# Patient Record
Sex: FEMALE | Race: WHITE | NOT HISPANIC OR LATINO | Employment: PART TIME | URBAN - METROPOLITAN AREA
[De-identification: names, ages, dates, MRNs, and addresses within clinical notes are randomized per-mention and may not be internally consistent; named-entity substitution may affect disease eponyms.]

---

## 2021-06-30 ENCOUNTER — OFFICE VISIT (OUTPATIENT)
Dept: FAMILY MEDICINE | Age: 46
End: 2021-06-30

## 2021-06-30 VITALS
WEIGHT: 160.4 LBS | HEART RATE: 88 BPM | HEIGHT: 65 IN | OXYGEN SATURATION: 96 % | SYSTOLIC BLOOD PRESSURE: 130 MMHG | BODY MASS INDEX: 26.73 KG/M2 | DIASTOLIC BLOOD PRESSURE: 80 MMHG

## 2021-06-30 DIAGNOSIS — R07.9 CHEST PAIN, UNSPECIFIED TYPE: Primary | ICD-10-CM

## 2021-06-30 PROCEDURE — 99203 OFFICE O/P NEW LOW 30 MIN: CPT | Performed by: PHYSICIAN ASSISTANT

## 2021-06-30 PROCEDURE — 93000 ELECTROCARDIOGRAM COMPLETE: CPT | Performed by: PHYSICIAN ASSISTANT

## 2021-06-30 ASSESSMENT — PATIENT HEALTH QUESTIONNAIRE - PHQ9
SUM OF ALL RESPONSES TO PHQ9 QUESTIONS 1 AND 2: 0
2. FEELING DOWN, DEPRESSED OR HOPELESS: NOT AT ALL
CLINICAL INTERPRETATION OF PHQ2 SCORE: NO FURTHER SCREENING NEEDED
SUM OF ALL RESPONSES TO PHQ9 QUESTIONS 1 AND 2: 0
CLINICAL INTERPRETATION OF PHQ9 SCORE: NO FURTHER SCREENING NEEDED
1. LITTLE INTEREST OR PLEASURE IN DOING THINGS: NOT AT ALL

## 2021-07-01 ENCOUNTER — TELEPHONE (OUTPATIENT)
Dept: FAMILY MEDICINE | Age: 46
End: 2021-07-01

## 2021-07-01 DIAGNOSIS — Z01.812 PRE-PROCEDURAL LABORATORY EXAMINATION: Primary | ICD-10-CM

## 2021-07-27 ENCOUNTER — LAB SERVICES (OUTPATIENT)
Dept: LAB | Age: 46
End: 2021-07-27

## 2021-07-27 DIAGNOSIS — Z01.812 PRE-PROCEDURAL LABORATORY EXAMINATION: ICD-10-CM

## 2021-07-27 PROCEDURE — U0005 INFEC AGEN DETEC AMPLI PROBE: HCPCS | Performed by: INTERNAL MEDICINE

## 2021-07-27 PROCEDURE — U0003 INFECTIOUS AGENT DETECTION BY NUCLEIC ACID (DNA OR RNA); SEVERE ACUTE RESPIRATORY SYNDROME CORONAVIRUS 2 (SARS-COV-2) (CORONAVIRUS DISEASE [COVID-19]), AMPLIFIED PROBE TECHNIQUE, MAKING USE OF HIGH THROUGHPUT TECHNOLOGIES AS DESCRIBED BY CMS-2020-01-R: HCPCS | Performed by: INTERNAL MEDICINE

## 2021-07-28 LAB
SARS-COV-2 RNA RESP QL NAA+PROBE: NOT DETECTED
SERVICE CMNT-IMP: NORMAL
SERVICE CMNT-IMP: NORMAL

## 2021-07-29 ENCOUNTER — TELEPHONE (OUTPATIENT)
Dept: FAMILY MEDICINE | Age: 46
End: 2021-07-29

## 2021-07-29 ENCOUNTER — HOSPITAL ENCOUNTER (OUTPATIENT)
Dept: CV DIAGNOSTICS | Age: 46
Discharge: HOME | End: 2021-07-29
Attending: PHYSICIAN ASSISTANT

## 2021-07-29 DIAGNOSIS — R07.9 CHEST PAIN, UNSPECIFIED TYPE: ICD-10-CM

## 2021-07-29 PROCEDURE — 93352 ADMIN ECG CONTRAST AGENT: CPT | Performed by: INTERNAL MEDICINE

## 2021-07-29 PROCEDURE — 93351 STRESS TTE COMPLETE: CPT | Performed by: INTERNAL MEDICINE

## 2021-07-29 PROCEDURE — 10004168 STRESS TEST

## 2023-03-09 ENCOUNTER — OFFICE VISIT (OUTPATIENT)
Dept: FAMILY MEDICINE | Facility: CLINIC | Age: 48
End: 2023-03-09
Payer: COMMERCIAL

## 2023-03-09 VITALS
RESPIRATION RATE: 16 BRPM | TEMPERATURE: 97.7 F | HEART RATE: 89 BPM | HEIGHT: 66 IN | BODY MASS INDEX: 25.44 KG/M2 | DIASTOLIC BLOOD PRESSURE: 72 MMHG | WEIGHT: 158.3 LBS | SYSTOLIC BLOOD PRESSURE: 118 MMHG | OXYGEN SATURATION: 100 %

## 2023-03-09 DIAGNOSIS — H91.91 HEARING LOSS OF RIGHT EAR, UNSPECIFIED HEARING LOSS TYPE: Primary | ICD-10-CM

## 2023-03-09 PROCEDURE — 99203 OFFICE O/P NEW LOW 30 MIN: CPT | Performed by: STUDENT IN AN ORGANIZED HEALTH CARE EDUCATION/TRAINING PROGRAM

## 2023-03-09 ASSESSMENT — PAIN SCALES - GENERAL: PAINLEVEL: NO PAIN (0)

## 2023-03-09 NOTE — PROGRESS NOTES
"  1. Hearing loss of right ear, unspecified hearing loss type  > unclear if sensorineural vs conductive hearing loss, no tuning fork in clinic   > patient failed clinic performed hearing screen in right ear   - no history of trauma to her ear, although she has been to multiple concerts as a teenager   - Adult Audiology  Referral; Future      Subjective   Alva is a 47 year old, presenting for the following health issues:  Hearing Problem and Establish Care      Hearing Problem    History of Present Illness       Reason for visit:  Hearing loss, establish care    She eats 2-3 servings of fruits and vegetables daily.She consumes 0 sweetened beverage(s) daily.She exercises with enough effort to increase her heart rate 20 to 29 minutes per day.  She exercises with enough effort to increase her heart rate 3 or less days per week.   She is taking medications regularly.       Concern - Hearing loss  Onset: years   Description: in her right ear. No pain. Constant white noise. No fullness feeling or pressure. Family has been complaining about it. Got ear buds for Rodger and couldn't hear anything in her right ear unless it was at max volume  Intensity: severe  Progression of Symptoms:  same  Accompanying Signs & Symptoms: none  Previous history of similar problem: none  Precipitating factors:        Worsened by: none  Alleviating factors:        Improved by: none  Therapies tried and outcome:  none     Works in a [employer] store   No history of loud noise exposure other than occasional concerts as a teenager 3 times per year   No trauma to ear   Does use q-tips to clear out her cerumen   Endorses constant static sounding noise in the right ear       Review of Systems   As above       Objective    /72 (BP Location: Right arm, Patient Position: Sitting, Cuff Size: Adult Regular)   Pulse 89   Temp 97.7  F (36.5  C) (Tympanic)   Resp 16   Ht 1.674 m (5' 5.91\")   Wt 71.8 kg (158 lb 4.8 oz)   LMP 03/02/2023 " (Approximate)   SpO2 100%   BMI 25.62 kg/m    Body mass index is 25.62 kg/m .  Physical Exam  Constitutional:       General: She is not in acute distress.     Appearance: Normal appearance.   HENT:      Head: Normocephalic.      Right Ear: Ear canal and external ear normal. There is no impacted cerumen.      Left Ear: Tympanic membrane, ear canal and external ear normal. There is no impacted cerumen.      Ears:      Comments: Two bubbles noted in right TM, there was no perforation, infection, erythema, draining, or obvious signs of infection      Nose: Nose normal. No congestion or rhinorrhea.      Mouth/Throat:      Mouth: Mucous membranes are moist.      Pharynx: Oropharynx is clear. No oropharyngeal exudate or posterior oropharyngeal erythema.      Comments: Chewing gum   Eyes:      General: No scleral icterus.        Right eye: No discharge.         Left eye: No discharge.      Extraocular Movements: Extraocular movements intact.      Conjunctiva/sclera: Conjunctivae normal.   Pulmonary:      Effort: Pulmonary effort is normal. No respiratory distress.   Musculoskeletal:         General: Normal range of motion.      Cervical back: Normal range of motion.   Skin:     General: Skin is warm.      Comments: No rash on exposed skin   Neurological:      General: No focal deficit present.      Mental Status: She is alert and oriented to person, place, and time.   Psychiatric:         Mood and Affect: Mood normal.         Behavior: Behavior normal.

## 2023-05-01 ENCOUNTER — TELEPHONE (OUTPATIENT)
Dept: FAMILY MEDICINE | Facility: CLINIC | Age: 48
End: 2023-05-01
Payer: COMMERCIAL

## 2023-05-01 NOTE — TELEPHONE ENCOUNTER
Patient Quality Outreach    Patient is due for the following:   Colon Cancer Screening  Breast Cancer Screening - Mammogram  Cervical Cancer Screening - PAP Needed  Physical Preventive Adult Physical    Next Steps:   Schedule a Adult Preventative    Type of outreach:    Sent letter.    Next Steps:  Reach out within 90 days via Letter.    Max number of attempts reached: Yes. Will try again in 90 days if patient still on fail list.    Questions for provider review:    None     Mary Jane CAMPBELL LPN

## 2023-05-01 NOTE — LETTER
May 1, 2023      Alva Trevino  [address] [place] [id number]    Your team at [facility] cares about your health. We have reviewed your chart and based on our findings; we are making the following recommendations to better manage your health.     You are in particular need of attention regarding the following:     Schedule Annual MAMMOGRAPHY. [facility] scheduling number is [phone] or schedule in [employer] (self referral).  1 in 8 women will develop invasive breast cancer during her lifetime and it is the most common non-skin cancer in [place] Women. EARLY detection, new treatments, and a better understanding of the disease have increased survival rates- the 5 year survival rate in the 1960's was 63% and today it is close to 90%.  If you are under/uninsured, we recommend you contact the [facility]. They offer mammograms at no charge or on a sliding fee charge. You can schedule with them at [phone]. Please have them send us the results.     Schedule a PREVENTATIVE VISIT: Physical with your provider for a Pap Smear to screen for Cervical Cancer.    Call or MyChart message your clinic to schedule a colonoscopy, schedule/ a FIT Test, or order a Cologuard test. If you are unsure what type of test you need, please call your clinic and speak to clinic staff.   Colon cancer is now the second leading cause of cancer-related deaths in the [place] for both men and women and there are over 130,000 new cases and 50,000 deaths per year from colon cancer. Colonoscopies can prevent 90-95% of these deaths. Problem lesions can be removed before they ever become cancer. This test is not only looking for cancer, but also getting rid of precancerous lesions.   If you are under/uninsured, we recommend you contact the [facility] is a free colorectal cancer screening program that provides colonoscopies for eligible under/uninsured [place] men and women. If you are  interested in receiving a free colonoscopy, please call [facility] at t [phone] (mention code [facility]) to see if you're eligible. Please have them send us the results at [phone].     If you have already completed these items, please contact the clinic via phone or   [employer] so your care team can review and update your records. Thank you for   choosing [facility] for your healthcare needs. For any questions,   concerns, or to schedule an appointment please contact our clinic.    Healthy Regards,      Your [facility] Team

## 2023-05-04 ENCOUNTER — OFFICE VISIT (OUTPATIENT)
Dept: AUDIOLOGY | Facility: CLINIC | Age: 48
End: 2023-05-04
Attending: STUDENT IN AN ORGANIZED HEALTH CARE EDUCATION/TRAINING PROGRAM
Payer: COMMERCIAL

## 2023-05-04 DIAGNOSIS — H91.91 HEARING LOSS OF RIGHT EAR, UNSPECIFIED HEARING LOSS TYPE: ICD-10-CM

## 2023-05-04 DIAGNOSIS — H93.11 RIGHT-SIDED TINNITUS: ICD-10-CM

## 2023-05-04 DIAGNOSIS — H90.3 SENSORINEURAL HEARING LOSS, ASYMMETRICAL: Primary | ICD-10-CM

## 2023-05-04 PROCEDURE — 92557 COMPREHENSIVE HEARING TEST: CPT | Performed by: AUDIOLOGIST

## 2023-05-04 PROCEDURE — 92550 TYMPANOMETRY & REFLEX THRESH: CPT | Performed by: AUDIOLOGIST

## 2023-05-04 NOTE — PROGRESS NOTES
AUDIOLOGY REPORT    SUBJECTIVE:  Alva Trevino is a 47 year old female who was seen in the Audiology Clinic [facility] on 5/04/23 for audiologic evaluation, referred by Ana Paula Fenton MD.  The patient reports a hearing loss in her right ear only which has been present for the past 10 years or so. She reports that her mother also has single sided hearing loss. Patient reports tinnitus in her right ear only. The patient denies  tinnitus in the left ear, bilateral otalgia, bilateral drainage, bilateral aural fullness, history of noise exposure, and dizziness. The patient notes difficulty with communication in a variety of listening situations. Patient was unaccompanied to today's visit.     Abuse Screening:  Do you feel unsafe at home or work/school? No  Do you feel threatened by someone? No  Does anyone try to keep you from having contact with others, or doing things outside of your home? No  Physical signs of abuse present? No     Fall Risk Screen:  1. Have you fallen two or more times in the past year? No  2. Have you fallen and had an injury in the past year? No    OBJECTIVE:    Otoscopic exam indicates ears are clear of cerumen bilaterally     Pure Tone Thresholds assessed using standard techniques  audiometry with good  reliability from 250-8000 Hz bilaterally using insert earphones and circumaural headphones     RIGHT:  moderate-severe and profound sensorineural hearing loss    LEFT:    normal hearing sensitivity through 6000 Hz then a mild most likely sensorineural hearing loss at 8000 Hz only   NOTE: Changing to ER-3A inserts significantly improved thresholds at 8000 Hz in the left ear only    Jyotsna: Negative     Tympanogram:    RIGHT: normal eardrum mobility    LEFT:   normal eardrum mobility    Reflexes (reported by stimulus ear): 1000 Hz  RIGHT: Ipsilateral is absent at frequencies tested  RIGHT: Contralateral is elevated   LEFT:   Ipsilateral is present at normal levels  LEFT:    Contralateral is present at normal levels    Speech Reception Threshold:    RIGHT: 95 dB HL    LEFT:   10 dB HL    Word Recognition Score:     RIGHT: 28% at 100 dB HL using NU-6 recorded word list.    LEFT:   100% at 50 dB HL using NU-6 recorded word list.    ASSESSMENT:   Bilateral asymmetric sensorineural hearing loss      Today s results were discussed with the patient in detail.     PLAN:  Patient was counseled regarding hearing loss and impact on communication.  Patient is a good candidate for CROS amplification at this time. Handout on good communication strategies, and hearing aid use was given to patient. It is recommended that the patient see ENT. Following ENT appointment patient is welcome to return to audiology for a consultation on CROS technology if she would like. Please call this clinic with questions regarding these results or recommendations.    Tc Hobbs CCC-A  Licensed Audiologist #[id number]  5/4/2023    CC: Dr. Fenton

## 2023-05-21 ENCOUNTER — HEALTH MAINTENANCE LETTER (OUTPATIENT)
Age: 48
End: 2023-05-21

## 2023-08-25 NOTE — PROGRESS NOTES
Chief Complaint   Patient presents with    Hearing Problem     Gradual hearing loss in right ear go over results from 5/4/23     History of Present Illness   Alva Trevino is a 47 year old female who presents to me today for ear evaluation.  I am seeing this patient in consultation for hearing loss right ear at the request of the provider Dr. Fenton. The patient reports gradual decreased hearing in the right ear for the last few years.  It was thought sudden in onset.  The patient has noticed increased difficulty hearing certain sounds and difficulty in understanding others, especially in noisy or crowded situations.  There is no history of recent head trauma, or chronic ear disease or ear surgery.  The patient denies significant recreational or work-related noise exposure.  No family history of hearing loss at a young age. The patient denies vertigo, otorrhea, otalgia.  Intermittent tinnitus greater on the right-hand side.    The patient underwent an audiogram performed 5/4/2023.  My review of the audiogram showed severe sensorineural hearing loss in the right ear and normal hearing to 4000 Hz sloping to mild high-frequency sensorineural hearing loss in the left ear.  Pure-tone average was 82 dB on the right and 11 dB on the left.  Speech reception threshold was 95 dB on the right and 10 dB on the left.  The patient had 28% word recognition on the right and 100% word recognition on the left.  The patient had a type A tympanogram on the right and a type A tympanogram on the left.    Past Medical History  There is no problem list on file for this patient.    Current Medications     Current Outpatient Medications:     diazepam (VALIUM) 5 MG tablet, Take 1 tablet (5 mg) by mouth once for 1 dose 20-30 minutes prior to MRI. Patient needs  home, Disp: 1 tablet, Rfl: 0    Allergies  No Known Allergies    Social History   Social History     Socioeconomic History    Marital status:    Tobacco Use    Smoking  status: Never    Smokeless tobacco: Never   Vaping Use    Vaping Use: Never used   Substance and Sexual Activity    Alcohol use: Yes    Drug use: Never    Sexual activity: Yes     Partners: Male     Birth control/protection: None   Other Topics Concern    Parent/sibling w/ CABG, MI or angioplasty before 65F 55M? No       Family History  Family History   Problem Relation Age of Onset    Coronary Artery Disease Paternal Grandfather        Review of Systems  As per HPI and PMHx, otherwise 10+ comprehensive system review is negative.    Physical Exam  /76 (BP Location: Right arm, Patient Position: Chair, Cuff Size: Adult Regular)   Pulse 79   Temp 98.5  F (36.9  C) (Tympanic)   Wt 70.3 kg (155 lb)   BMI 25.09 kg/m    GENERAL: Patient is a pleasant, cooperative 47 year old female in no acute distress.  HEAD: Normocephalic, atraumatic.  Hair and scalp are normal.  EYES: Pupils are equal, round, reactive to light and accommodation.  Extraocular movements are intact.  The sclera nonicteric without injection.  The extraocular structures are normal.  EARS: Normal shape and symmetry.  No tenderness when palpating the mastoid or tragal areas bilaterally.  Otoscopic exam reveals a minimal amount of cerumen bilaterally.  The bilateral tympanic membranes are round, intact without evidence of effusion, good landmarks.  No retraction, granulation, or drainage.  NOSE: Nares are patent.  Nasal mucosa is pink and moist.  Negative anterior rhinoscopy.  NEUROLOGIC: Cranial nerves II through XII are grossly intact.  Voice is strong.  Patient is House-Brackmann I/VI bilaterally.  CARDIOVASCULAR: Extremities are warm and well-perfused.  No significant peripheral edema.  RESPIRATORY: Patient has nonlabored breathing without cough, wheeze, stridor.  PSYCHIATRIC: Patient is alert and oriented.  Mood and affect appear normal.  SKIN: Warm and dry.  No scalp, face, or neck lesions noted.    Assessment and Plan     ICD-10-CM    1.  Sensorineural hearing loss, bilateral  H90.3 MR Brain w/o & w Contrast     diazepam (VALIUM) 5 MG tablet      2. High frequency sensorineural hearing loss of left ear  H90.5 MR Brain w/o & w Contrast     diazepam (VALIUM) 5 MG tablet      3. Asymmetrical sensorineural hearing loss  H90.3 MR Brain w/o & w Contrast     diazepam (VALIUM) 5 MG tablet        It was my pleasure seeing Alva Trevino today in clinic.  The patient presents today with significant right sensorineural hearing loss.  Given the severe nature of her asymmetry and the previous sudden nature of the change, I would recommend an MRI with IAC protocol to rule out retrocochlear pathology.  We spent the remainder of today's visit on education. We discussed hearing protection in noisy environments.    The patient is medically cleared for consideration of a hearing aid evaluation.    The patient will follow up as necessary for worsening symptoms or changes in symptoms. I have also recommended repeat audiogram in 1-3 years, or sooner if symptoms warrant.      Flynn De Leon MD  Department of Otolaryngology-Head and Neck Surgery  [facility]

## 2023-08-28 ENCOUNTER — OFFICE VISIT (OUTPATIENT)
Dept: OTOLARYNGOLOGY | Facility: CLINIC | Age: 48
End: 2023-08-28
Payer: COMMERCIAL

## 2023-08-28 VITALS
BODY MASS INDEX: 25.09 KG/M2 | DIASTOLIC BLOOD PRESSURE: 76 MMHG | SYSTOLIC BLOOD PRESSURE: 120 MMHG | HEART RATE: 79 BPM | TEMPERATURE: 98.5 F | WEIGHT: 155 LBS

## 2023-08-28 DIAGNOSIS — H90.3 ASYMMETRICAL SENSORINEURAL HEARING LOSS: ICD-10-CM

## 2023-08-28 DIAGNOSIS — H90.3 SENSORINEURAL HEARING LOSS, BILATERAL: Primary | ICD-10-CM

## 2023-08-28 DIAGNOSIS — H90.5 HIGH FREQUENCY SENSORINEURAL HEARING LOSS OF LEFT EAR: ICD-10-CM

## 2023-08-28 PROCEDURE — 99204 OFFICE O/P NEW MOD 45 MIN: CPT | Performed by: OTOLARYNGOLOGY

## 2023-08-28 RX ORDER — DIAZEPAM 5 MG
5 TABLET ORAL ONCE
Qty: 1 TABLET | Refills: 0 | Status: SHIPPED | OUTPATIENT
Start: 2023-08-28 | End: 2023-08-28

## 2023-08-28 ASSESSMENT — PAIN SCALES - GENERAL: PAINLEVEL: NO PAIN (0)

## 2023-08-28 NOTE — LETTER
8/28/2023         RE: Alva Trevino  [address] [place] [id number]        Dear Colleague,    Thank you for referring your patient, Alva Trevino, to the [facility]. Please see a copy of my visit note below.    Chief Complaint   Patient presents with     Hearing Problem     Gradual hearing loss in right ear go over results from 5/4/23     History of Present Illness   Alva Trevino is a 47 year old female who presents to me today for ear evaluation.  I am seeing this patient in consultation for hearing loss right ear at the request of the provider Dr. Fenton. The patient reports gradual decreased hearing in the right ear for the last few years.  It was thought sudden in onset.  The patient has noticed increased difficulty hearing certain sounds and difficulty in understanding others, especially in noisy or crowded situations.  There is no history of recent head trauma, or chronic ear disease or ear surgery.  The patient denies significant recreational or work-related noise exposure.  No family history of hearing loss at a young age. The patient denies vertigo, otorrhea, otalgia.  Intermittent tinnitus greater on the right-hand side.    The patient underwent an audiogram performed 5/4/2023.  My review of the audiogram showed severe sensorineural hearing loss in the right ear and normal hearing to 4000 Hz sloping to mild high-frequency sensorineural hearing loss in the left ear.  Pure-tone average was 82 dB on the right and 11 dB on the left.  Speech reception threshold was 95 dB on the right and 10 dB on the left.  The patient had 28% word recognition on the right and 100% word recognition on the left.  The patient had a type A tympanogram on the right and a type A tympanogram on the left.    Past Medical History  There is no problem list on file for this patient.    Current Medications     Current Outpatient Medications:      diazepam (VALIUM) 5 MG tablet, Take 1 tablet (5 mg) by mouth once  for 1 dose 20-30 minutes prior to MRI. Patient needs  home, Disp: 1 tablet, Rfl: 0    Allergies  No Known Allergies    Social History   Social History     Socioeconomic History     Marital status:    Tobacco Use     Smoking status: Never     Smokeless tobacco: Never   Vaping Use     Vaping Use: Never used   Substance and Sexual Activity     Alcohol use: Yes     Drug use: Never     Sexual activity: Yes     Partners: Male     Birth control/protection: None   Other Topics Concern     Parent/sibling w/ CABG, MI or angioplasty before 65F 55M? No       Family History  Family History   Problem Relation Age of Onset     Coronary Artery Disease Paternal Grandfather        Review of Systems  As per HPI and PMHx, otherwise 10+ comprehensive system review is negative.    Physical Exam  /76 (BP Location: Right arm, Patient Position: Chair, Cuff Size: Adult Regular)   Pulse 79   Temp 98.5  F (36.9  C) (Tympanic)   Wt 70.3 kg (155 lb)   BMI 25.09 kg/m    GENERAL: Patient is a pleasant, cooperative 47 year old female in no acute distress.  HEAD: Normocephalic, atraumatic.  Hair and scalp are normal.  EYES: Pupils are equal, round, reactive to light and accommodation.  Extraocular movements are intact.  The sclera nonicteric without injection.  The extraocular structures are normal.  EARS: Normal shape and symmetry.  No tenderness when palpating the mastoid or tragal areas bilaterally.  Otoscopic exam reveals a minimal amount of cerumen bilaterally.  The bilateral tympanic membranes are round, intact without evidence of effusion, good landmarks.  No retraction, granulation, or drainage.  NOSE: Nares are patent.  Nasal mucosa is pink and moist.  Negative anterior rhinoscopy.  NEUROLOGIC: Cranial nerves II through XII are grossly intact.  Voice is strong.  Patient is House-Brackmann I/VI bilaterally.  CARDIOVASCULAR: Extremities are warm and well-perfused.  No significant peripheral edema.  RESPIRATORY: Patient  has nonlabored breathing without cough, wheeze, stridor.  PSYCHIATRIC: Patient is alert and oriented.  Mood and affect appear normal.  SKIN: Warm and dry.  No scalp, face, or neck lesions noted.    Assessment and Plan     ICD-10-CM    1. Sensorineural hearing loss, bilateral  H90.3 MR Brain w/o & w Contrast     diazepam (VALIUM) 5 MG tablet      2. High frequency sensorineural hearing loss of left ear  H90.5 MR Brain w/o & w Contrast     diazepam (VALIUM) 5 MG tablet      3. Asymmetrical sensorineural hearing loss  H90.3 MR Brain w/o & w Contrast     diazepam (VALIUM) 5 MG tablet        It was my pleasure seeing Alva Trevino today in clinic.  The patient presents today with significant right sensorineural hearing loss.  Given the severe nature of her asymmetry and the previous sudden nature of the change, I would recommend an MRI with IAC protocol to rule out retrocochlear pathology.  We spent the remainder of today's visit on education. We discussed hearing protection in noisy environments.    The patient is medically cleared for consideration of a hearing aid evaluation.    The patient will follow up as necessary for worsening symptoms or changes in symptoms. I have also recommended repeat audiogram in 1-3 years, or sooner if symptoms warrant.      Flynn De Leon MD  Department of Otolaryngology-Head and Neck Surgery  [facility]       Again, thank you for allowing me to participate in the care of your patient.        Sincerely,        Flynn De Leon MD

## 2023-08-28 NOTE — NURSING NOTE
"Initial /76 (BP Location: Right arm, Patient Position: Chair, Cuff Size: Adult Regular)   Pulse 79   Temp 98.5  F (36.9  C) (Tympanic)   Wt 70.3 kg (155 lb)   BMI 25.09 kg/m   Estimated body mass index is 25.09 kg/m  as calculated from the following:    Height as of 3/9/23: 1.674 m (5' 5.91\").    Weight as of this encounter: 70.3 kg (155 lb). .  Awa Calvo LPN    "

## 2023-09-05 ENCOUNTER — TELEPHONE (OUTPATIENT)
Dept: FAMILY MEDICINE | Facility: CLINIC | Age: 48
End: 2023-09-05
Payer: COMMERCIAL

## 2023-09-05 NOTE — TELEPHONE ENCOUNTER
.Patient Quality Outreach    Patient is due for the following:   Colon Cancer Screening  Breast Cancer Screening - Mammogram  Cervical Cancer Screening - PAP Needed  Physical Preventive Adult Physical    Next Steps:   Schedule a Adult Preventative    Type of outreach:    Sent [employer] message.    Next Steps:  Reach out within 90 days via Letter.    Max number of attempts reached: Yes. Will try again in 90 days if patient still on fail list.    Questions for provider review:    None           Mary Jane CAMPBELL LPN

## 2023-09-11 ENCOUNTER — HOSPITAL ENCOUNTER (OUTPATIENT)
Dept: MRI IMAGING | Facility: CLINIC | Age: 48
Discharge: HOME | End: 2023-09-11
Attending: OTOLARYNGOLOGY | Admitting: OTOLARYNGOLOGY
Payer: COMMERCIAL

## 2023-09-11 DIAGNOSIS — H90.3 ASYMMETRICAL SENSORINEURAL HEARING LOSS: ICD-10-CM

## 2023-09-11 DIAGNOSIS — H90.3 SENSORINEURAL HEARING LOSS, BILATERAL: ICD-10-CM

## 2023-09-11 DIAGNOSIS — H90.5 HIGH FREQUENCY SENSORINEURAL HEARING LOSS OF LEFT EAR: ICD-10-CM

## 2023-09-11 PROCEDURE — A9585 GADOBUTROL INJECTION: HCPCS | Performed by: OTOLARYNGOLOGY

## 2023-09-11 PROCEDURE — 70551 MRI BRAIN STEM W/O DYE: CPT | Mod: 52

## 2023-09-11 PROCEDURE — 255N000002 HC RX 255 OP 636: Performed by: OTOLARYNGOLOGY

## 2023-09-11 RX ORDER — GADOBUTROL 604.72 MG/ML
7 INJECTION INTRAVENOUS ONCE
Status: COMPLETED | OUTPATIENT
Start: 2023-09-11 | End: 2023-09-11

## 2023-10-04 ENCOUNTER — OFFICE VISIT (OUTPATIENT)
Dept: AUDIOLOGY | Facility: CLINIC | Age: 48
End: 2023-10-04
Payer: COMMERCIAL

## 2023-10-04 DIAGNOSIS — H90.3 SENSORINEURAL HEARING LOSS, ASYMMETRICAL: Primary | ICD-10-CM

## 2023-10-04 PROCEDURE — 92590 PR HEARING AID EXAM MONAURAL: CPT | Performed by: AUDIOLOGIST

## 2023-10-04 NOTE — PROGRESS NOTES
AUDIOLOGY REPORT    SUBJECTIVE: Alva Trevino is a 47 year old female was seen in the Audiology Clinic at [facility] on 10/04/23 to discuss concerns with hearing and functional communication difficulties. Alva has been seen previously on 5/4/2023, and results revealed an asymmetric sensorineural hearing loss.  The patient was medically evaluated and determined to be cleared for a left hearing aid and right ear CROS by Dr. De Leon. Alva notes difficulty with communication in a variety of listening situations. Patient was unaccompanied to today's visit.       OBJECTIVE:  Patient is a hearing aid candidate. Patient would like to move forward with a hearing aid evaluation today. Therefore, the patient was presented with different options for amplification to help aid in communication. Discussed styles, levels of technology and monaural vs. binaural fitting.     The hearing aid(s) mutually chosen were:  Left: Oticon Real 2R & CROS PX  COLOR: 93   BATTERY SIZE: rechargeable  EARMOLD/TIPS: 8 mm open domes   CANAL/ LENGTH: 2 60dB L/R    Otoscopy revealed ears are clear of cerumen bilaterally.     ASSESSMENT:   Bilateral asymmetric sensorineural hearing loss      Reviewed purchase information and warranty information with patient. The 45 day trial period was explained to patient. The patient was given a copy of the [place] [employer] consumer brochure on purchasing hearing instruments. Patient risk factors have been provided to the patient in writing prior to the sale of the hearing aid per FDA regulation. The risk factors are also available in the User Instructional Booklet to be presented on the day of the hearing aid fitting. Hearing aid(s) ordered. Hearing aid evaluation completed. Patient was advised to check with their insurance to ascertain of they are eligible for any hearing aid benefits.     PLAN: Alva is scheduled to return in 2-3 weeks for a hearing aid fitting  and programming. Purchase agreement will be completed on that date. Please contact this clinic with any questions or concerns.      Tc Hobbs CCC-A  Licensed Audiologist #[id number]  10/4/2023

## 2023-10-25 ENCOUNTER — OFFICE VISIT (OUTPATIENT)
Dept: AUDIOLOGY | Facility: CLINIC | Age: 48
End: 2023-10-25
Payer: COMMERCIAL

## 2023-10-25 DIAGNOSIS — H90.41 SENSORINEURAL HEARING LOSS (SNHL) OF RIGHT EAR WITH UNRESTRICTED HEARING OF LEFT EAR: Primary | ICD-10-CM

## 2023-10-25 PROCEDURE — 92592 PR HEARING AID CHECK, MONAURAL: CPT | Performed by: AUDIOLOGIST

## 2023-10-25 PROCEDURE — V5011 HEARING AID FITTING/CHECKING: HCPCS | Performed by: AUDIOLOGIST

## 2023-10-25 PROCEDURE — V5221 HEARING AID BINAURAL BTE/BTE: HCPCS | Performed by: AUDIOLOGIST

## 2023-10-25 PROCEDURE — V5240 DISP FEE CONTRALATERAL BINAU: HCPCS | Performed by: AUDIOLOGIST

## 2023-10-25 PROCEDURE — V5020 CONFORMITY EVALUATION: HCPCS | Performed by: AUDIOLOGIST

## 2023-10-25 NOTE — PATIENT INSTRUCTIONS

## 2023-10-25 NOTE — PROGRESS NOTES
AUDIOLOGY REPORT    SUBJECTIVE: Alva Trevino, a 47 year old female, was seen in the Audiology Clinic at [facility] today for a Left hearing aid fitting. Previous results have revealed a unilateral sensorineural hearing loss. The patient was given medical clearance to pursue amplification by  Flynn De Leon MD. Patient was unaccompanied to today's visit.         OBJECTIVE:  Prior to fitting, a hearing aid check was performed to ensure device functionality. The hearing aid conformity evaluation was completed.The hearing aids were placed and they provided a good fit. Real-ear-probe-microphone measurements were completed on the [employer] system and were a good match to NAL-NL2 target with soft sounds audible, moderate sounds comfortable, and loud sounds below discomfort. UCLs are verified through maximum power output measures and demonstrate appropriate limiting of loud inputs. Ms. Trevino was oriented to proper hearing aid use, care, cleaning (no water, dry brush), batteries (size rechargeable, insertion/removal, toxicity, low-battery signal), aid insertion/removal, user booklet, warranty information, storage cases, and other hearing aid details. The patient confirmed understanding of hearing aid use and care, and showed proper insertion of hearing aid and batteries while in the office today. Ms. Trevino reported good volume and sound quality today.    EAR(S) FIT: Left  HEARING AID MAKE: Right: Oticon; Left: Oticon    HEARING AID MODEL #: Right: CROS PX; Left: Real 2R  HEARING AID STYLE: Right: RITE; Left: RITE  DOME SIZE: Right:  6 mm open; Left::  6 mm open   LENGTH: Right:  2 60 dB; Left:  2 60 dB  SERIAL NUMBERS: Right: [id number]; Left: [id number]  WARRANTY END DATE: Right: 11/10/2026; Left:: 11/10/2026      (The patient Does Not Require a signed a waiver that is on file agreeing to the upgrade charge, per their insurance contract. )        ASSESSMENT: Left ear hearing aid and right ear CROS  fitting completed today. Verification measures were performed. The 45 day trial period was explained to patient, and they expressed understanding. Ms. Trevino signed the Hearing Aid Purchase Agreement and was given a copy, as well as details on her hearing aids. Patient was counseled that exact out of pocket amounts cannot be determined for hearing aid claims being sent to insurance. Any insurance coverage information presented to the patient is an estimate only, and is not a guarantee of payment. Patient has been advised to check with their own insurance.    PLAN: Ms. Trevino will return for follow-up in 2-3 weeks for a hearing aid review appointment. Please call this clinic with questions regarding today s appointment.    Tc Hobbs CCC-A  Licensed Audiologist #[id number]  10/25/2023

## 2023-11-15 ENCOUNTER — OFFICE VISIT (OUTPATIENT)
Dept: AUDIOLOGY | Facility: CLINIC | Age: 48
End: 2023-11-15
Payer: COMMERCIAL

## 2023-11-15 DIAGNOSIS — H90.3 SENSORINEURAL HEARING LOSS, ASYMMETRICAL: Primary | ICD-10-CM

## 2023-11-15 PROCEDURE — V5299 HEARING SERVICE: HCPCS | Performed by: AUDIOLOGIST

## 2023-11-15 NOTE — PROGRESS NOTES
AUDIOLOGY REPORT    SUBJECTIVE:Alva Trevino is a 47 year old female who was seen in the Audiology Clinic at the [facility] on 11/15/2023  for a follow-up check regarding the fitting of new hearing aids. Previous results have revealed a bilateral asymmetric sensorineural hearing loss.  The patient has been seen previously in this clinic and was fit with a CROS system on 10/25/2023.  Alva reports good sound quality with the hearing aid(s) and increased wear time with the heaing aids.  Patient was unaccompanied to today's visit.     OBJECTIVE:     Based on patient report, the following changes were made; None.    Reviewed 45 day trial period, care, cleaning (no water, dry brush), batteries (size rechargeable) insertion/removal, toxicity, low-battery signal), aid insertion/removal, volume adjustment (if applicable), user booklet, warranty information, storage cases, and other hearing aid details.      ASSESSMENT: A follow-up appointment for hearing aid fitting was completed today. Changes to hearing aid was completed as outlined above.     PLAN:Alva will return for follow-up as needed, or at least every 9-12 months for cleaning and assessment of hearing aid.   Please call this clinic with any questions regarding today s appointment.    Tc Hobbs CCC-A  Licensed Audiologist #[id number]  11/15/2023

## 2024-01-23 ENCOUNTER — TELEPHONE (OUTPATIENT)
Dept: FAMILY MEDICINE | Facility: CLINIC | Age: 49
End: 2024-01-23
Payer: COMMERCIAL

## 2024-01-23 NOTE — LETTER
January 23, 2024      Alva Trevino  [id number] [facility] [address]    Your team at [facility] cares about your health. We have reviewed your chart and based on our findings; we are making the following recommendations to better manage your health.     You are in particular need of attention regarding the following:     Schedule Annual MAMMOGRAPHY. [facility] scheduling number is [phone] or schedule in [employer] (self referral).  1 in 8 women will develop invasive breast cancer during her lifetime and it is the most common non-skin cancer in American Women. EARLY detection, new treatments, and a better understanding of the disease have increased survival rates- the 5 year survival rate in the 1960's was 63% and today it is close to 90%.  If you are under/uninsured, we recommend you contact the [facility]. They offer mammograms at no charge or on a sliding fee charge. You can schedule with them at [phone]. Please have them send us the results.     Schedule a PREVENTATIVE VISIT: Physical with your provider for a Pap Smear to screen for Cervical Cancer.    Call or MyChart message your clinic to schedule a colonoscopy, schedule/ a FIT Test, or order a Cologuard test. If you are unsure what type of test you need, please call your clinic and speak to clinic staff. Colon cancer is now the second leading cause of cancer-related deaths in the [place] for both men and women and there are over 130,000 new cases and 50,000 deaths per year from colon cancer. Colonoscopies can prevent 90-95% of these deaths. Problem lesions can be removed before they ever become cancer. This test is not only looking for cancer, but also getting rid of precancerous lesions.   If you are under/uninsured, we recommend you contact the [facility] [facility] is a free colorectal cancer screening program that provides colonoscopies for eligible under/uninsured [place] men and women. If you are  interested in receiving a free colonoscopy, please call [employer] [facility] at t [phone] (mention code [facility]) to see if you're eligible. Please have them send us the results.     If you have already completed these items, please contact the clinic via phone or   [employer] so your care team can review and update your records. Thank you for   choosing [facility] for your healthcare needs. For any questions,   concerns, or to schedule an appointment please contact our clinic at [phone].    Healthy Regards,  Your [facility] Care Team

## 2024-01-23 NOTE — TELEPHONE ENCOUNTER
Patient Quality Outreach    Patient is due for the following:   Colon Cancer Screening  Breast Cancer Screening - Mammogram  Cervical Cancer Screening - PAP Needed  Physical Preventive Adult Physical    Next Steps:   Schedule a Adult Preventative    Type of outreach:    Sent letter.    Next Steps:  Reach out within 90 days via [employer].    Max number of attempts reached: Yes. Will try again in 90 days if patient still on fail list.    Questions for provider review:    None           Mary Jane CAMPBELL LPN

## 2024-07-28 ENCOUNTER — HEALTH MAINTENANCE LETTER (OUTPATIENT)
Age: 49
End: 2024-07-28

## 2025-02-27 ENCOUNTER — OFFICE VISIT (OUTPATIENT)
Dept: OBGYN | Facility: CLINIC | Age: 50
End: 2025-02-27
Payer: COMMERCIAL

## 2025-02-27 VITALS
HEART RATE: 83 BPM | RESPIRATION RATE: 18 BRPM | DIASTOLIC BLOOD PRESSURE: 84 MMHG | BODY MASS INDEX: 26.16 KG/M2 | WEIGHT: 157 LBS | HEIGHT: 65 IN | TEMPERATURE: 98.6 F | SYSTOLIC BLOOD PRESSURE: 132 MMHG

## 2025-02-27 DIAGNOSIS — Z12.4 CERVICAL CANCER SCREENING: ICD-10-CM

## 2025-02-27 DIAGNOSIS — N63.21 MASS OF UPPER OUTER QUADRANT OF LEFT BREAST: ICD-10-CM

## 2025-02-27 DIAGNOSIS — N93.9 ABNORMAL UTERINE BLEEDING (AUB): ICD-10-CM

## 2025-02-27 DIAGNOSIS — Z01.419 WOMEN'S ANNUAL ROUTINE GYNECOLOGICAL EXAMINATION: Primary | ICD-10-CM

## 2025-02-27 NOTE — PROGRESS NOTES
SUBJECTIVE:   CC: Alva Trevino is an 49 year old woman who presents for preventive health visit.       Patient has been advised of split billing requirements and indicates understanding: Yes  Healthy Habits:  Do you get at least three servings of calcium containing foods daily (dairy, green leafy vegetables, etc.)? yes  Amount of exercise or daily activities, outside of work: 2 day(s) per week  Problems taking medications regularly No  Medication side effects: No  Have you had an eye exam in the past two years? no  Do you see a dentist twice per year? yes  Do you have sleep apnea, excessive snoring or daytime drowsiness?no    Patient has never had a mammogram in the past, but states she noticed a left breast lump 5 days ago. She states it is not painful, no rash or changes to the skin and no nipple discharge. Will order diagnostic mammogram and US.     Patient declined colonoscopy screening today  Influenza vaccine declined.   Patient declined screening labs today; no concerns per patient (TSH, hemoglobin A1c, CBC, CMP, lipid panel)   Patient due to pap smear will get this done today. No concerns for STD and no vaginal symptoms.     Patient states irregular menses that are more frequent and never the same (sometimes heavy and other times light) no dysmenorrhea. She is interested in birth control options. We discussed that she should get a work up done prior to starting birth control to make sure there is nothing else causing these symptoms. Patient to schedule appointment for work up of AUB.     Today's PHQ-2 Score:       2/27/2025    10:22 AM 2/26/2025     5:02 PM   PHQ-2 ( 1999 Pfizer)   Q1: Little interest or pleasure in doing things 0 0   Q2: Feeling down, depressed or hopeless 0 0   PHQ-2 Score 0 0    Q1: Little interest or pleasure in doing things  Not at all   Q2: Feeling down, depressed or hopeless  Not at all   PHQ-2 Score  0       Patient-reported       Abuse: Current or Past(Physical, Sexual or  Emotional)- No  Do you feel safe in your environment? Yes    Social History     Tobacco Use    Smoking status: Never    Smokeless tobacco: Never   Substance Use Topics    Alcohol use: Yes     Comment: occas     If you drink alcohol do you typically have >3 drinks per day or >7 drinks per week? No                     Reviewed orders with patient.  Reviewed health maintenance and updated orders accordingly - Yes  BP Readings from Last 3 Encounters:   25 132/84   23 120/76   23 118/72    Wt Readings from Last 3 Encounters:   25 71.2 kg (157 lb)   23 70.3 kg (155 lb)   23 71.8 kg (158 lb 4.8 oz)                  There is no problem list on file for this patient.    No past surgical history on file.    Social History     Tobacco Use    Smoking status: Never    Smokeless tobacco: Never   Substance Use Topics    Alcohol use: Yes     Comment: occas     Family History   Problem Relation Age of Onset    Coronary Artery Disease Paternal Grandfather          No current outpatient medications on file.     No Known Allergies  No lab results found.     FHS-7:        No data to display              click delete button to remove this line now  Mammogram Screening - Mammogram every 1-2 years updated in Health Maintenance based on mutual decision making  Pertinent mammograms are reviewed under the imaging tab.    Pertinent mammograms are reviewed under the imaging tab.  History of abnormal Pap smear: No - age 30- 64 PAP with HPV every 5 years recommended     Reviewed and updated as needed this visit by clinical staff    Allergies  Meds              Reviewed and updated as needed this visit by Provider                  No past medical history on file.   No past surgical history on file.  OB History    Para Term  AB Living   1 1 1 0 0 1   SAB IAB Ectopic Multiple Live Births   0 0 0 0 1      # Outcome Date GA Lbr Sukhwinder/2nd Weight Sex Type Anes PTL Lv   1 Term 07    F CS-Unspec    "EVE       ROS:  CONSTITUTIONAL: NEGATIVE for fever, chills, change in weight  INTEGUMENTARU/SKIN: NEGATIVE for worrisome rashes, moles or lesions  EYES: NEGATIVE for vision changes or irritation  ENT: NEGATIVE for ear, mouth and throat problems  RESP: NEGATIVE for significant cough or SOB  BREAST: Positive left breast lump.   CV: NEGATIVE for chest pain, palpitations or peripheral edema  GI: NEGATIVE for nausea, abdominal pain, heartburn, or change in bowel habits  : NEGATIVE for unusual urinary or vaginal symptoms. Periods are are irregular (AUB evaluation recommended)   MUSCULOSKELETAL: NEGATIVE for significant arthralgias or myalgia  NEURO: NEGATIVE for weakness, dizziness or paresthesias  ENDOCRINE: NEGATIVE for temperature intolerance, skin/hair changes  HEME/ALLERGY/IMMUNE: NEGATIVE for bleeding problems  PSYCHIATRIC: NEGATIVE for changes in mood or affect    OBJECTIVE:   /84 (BP Location: Right arm, Patient Position: Chair, Cuff Size: Adult Regular)   Pulse 83   Temp 98.6  F (37  C) (Tympanic)   Resp 18   Ht 1.651 m (5' 5\")   Wt 71.2 kg (157 lb)   LMP 02/20/2025 (Approximate)   BMI 26.13 kg/m    EXAM:  GENERAL: alert and no distress  EYES: Eyes grossly normal to inspection, PERRL and conjunctivae and sclerae normal  HENT: ear canals and TM's normal, nose and mouth without ulcers or lesions  NECK: no adenopathy, no asymmetry, masses, or scars  RESP: lungs clear to auscultation - no rales, rhonchi or wheezes  BREAST: right breast normal without masses, tenderness or nipple discharge and no palpable axillary masses or adenopathy. Left breast with 1.5 cm mobile non tender lump in the upper outer quadrant of the breast. No skin changes and no nipple discharge.   CV: regular rate and rhythm, normal S1 S2, no S3 or S4, no murmur, click or rub, no peripheral edema  ABDOMEN: soft, nontender, no hepatosplenomegaly, no masses and bowel sounds normal   (female) w/bimanual: normal female external " "genitalia, normal urethral meatus, normal vaginal mucosa, and normal cervix/adnexa/uterus without masses or discharge. Pap smear obtained today.   MS: no gross musculoskeletal defects noted, no edema  SKIN: no suspicious lesions or rashes  NEURO: Normal strength and tone, mentation intact and speech normal  PSYCH: mentation appears normal, affect normal/bright    Labs: patient declined labs today.     ASSESSMENT/PLAN:     (Z01.419) Women's annual routine gynecological examination  (primary encounter diagnosis)  Comment: patient declined annual labs, influenza vaccine, and colonoscopy today. Pap smear obtained today.   Plan: recommend yearly annual exam.     (N63.21) Mass of upper outer quadrant of left breast  Comment: imaging recommended and ordered.   Plan: MA Diagnostic Bilateral w/ Vignesh, US Breast Left        Complete 4 Quadrants          (N93.9) Abnormal uterine bleeding (AUB)  Comment: workup recommended with future visit to address this and to get labs, imaging, and possible EMB.   Plan: patient to schedule appointment for this.     Patient has been advised of split billing requirements and indicates understanding: Yes  COUNSELING:   Reviewed preventive health counseling, as reflected in patient instructions       Regular exercise       Healthy diet/nutrition    Estimated body mass index is 26.13 kg/m  as calculated from the following:    Height as of this encounter: 1.651 m (5' 5\").    Weight as of this encounter: 71.2 kg (157 lb).    Weight management plan: Discussed healthy diet and exercise guidelines    She reports that she has never smoked. She has never used smokeless tobacco.      Counseling Resources:  ATP IV Guidelines  Pooled Cohorts Equation Calculator  Breast Cancer Risk Calculator  BRCA-Related Cancer Risk Assessment: FHS-7 Tool  FRAX Risk Assessment  ICSI Preventive Guidelines  Dietary Guidelines for Americans, 2010  USDA's MyPlate  ASA Prophylaxis  Lung CA Screening    Opal Bowden, " KOBI  [facility] [place]

## 2025-02-27 NOTE — NURSING NOTE
"Initial /84 (BP Location: Right arm, Patient Position: Chair, Cuff Size: Adult Regular)   Pulse 83   Temp 98.6  F (37  C) (Tympanic)   Resp 18   Ht 1.651 m (5' 5\")   Wt 71.2 kg (157 lb)   LMP 02/20/2025 (Approximate)   BMI 26.13 kg/m   Estimated body mass index is 26.13 kg/m  as calculated from the following:    Height as of this encounter: 1.651 m (5' 5\").    Weight as of this encounter: 71.2 kg (157 lb). .    "

## 2025-03-04 LAB
BKR AP ASSOCIATED HPV REPORT: NORMAL
BKR LAB AP GYN ADEQUACY: NORMAL
BKR LAB AP GYN INTERPRETATION: NORMAL
BKR LAB AP LMP: NORMAL
BKR LAB AP PREVIOUS ABNORMAL: NORMAL
HPV HR 12 DNA CVX QL NAA+PROBE: NEGATIVE
HPV16 DNA CVX QL NAA+PROBE: NEGATIVE
HPV18 DNA CVX QL NAA+PROBE: NEGATIVE
HUMAN PAPILLOMA VIRUS FINAL DIAGNOSIS: NORMAL
PATH REPORT.COMMENTS IMP SPEC: NORMAL
PATH REPORT.COMMENTS IMP SPEC: NORMAL
PATH REPORT.RELEVANT HX SPEC: NORMAL

## 2025-03-17 ENCOUNTER — HOSPITAL ENCOUNTER (OUTPATIENT)
Dept: MAMMOGRAPHY | Facility: CLINIC | Age: 50
Discharge: HOME | End: 2025-03-17
Attending: PHYSICIAN ASSISTANT
Payer: COMMERCIAL

## 2025-03-17 ENCOUNTER — HOSPITAL ENCOUNTER (OUTPATIENT)
Dept: ULTRASOUND IMAGING | Facility: CLINIC | Age: 50
Discharge: HOME | End: 2025-03-17
Attending: PHYSICIAN ASSISTANT
Payer: COMMERCIAL

## 2025-03-17 DIAGNOSIS — N63.21 MASS OF UPPER OUTER QUADRANT OF LEFT BREAST: ICD-10-CM

## 2025-03-17 PROCEDURE — 19083 BX BREAST 1ST LESION US IMAG: CPT | Mod: LT

## 2025-03-17 PROCEDURE — 88305 TISSUE EXAM BY PATHOLOGIST: CPT | Mod: 26 | Performed by: PATHOLOGY

## 2025-03-17 PROCEDURE — 88305 TISSUE EXAM BY PATHOLOGIST: CPT | Mod: TC | Performed by: PHYSICIAN ASSISTANT

## 2025-03-17 PROCEDURE — 76641 ULTRASOUND BREAST COMPLETE: CPT | Mod: LT

## 2025-03-17 PROCEDURE — 999N000065 MA POST PROCEDURE LEFT

## 2025-03-17 PROCEDURE — 77066 DX MAMMO INCL CAD BI: CPT

## 2025-03-17 PROCEDURE — 250N000009 HC RX 250: Performed by: RADIOLOGY

## 2025-03-17 PROCEDURE — 272N000431 US BREAST BIOPSY CORE NEEDLE LEFT

## 2025-03-17 PROCEDURE — A4648 IMPLANTABLE TISSUE MARKER: HCPCS

## 2025-03-17 RX ADMIN — LIDOCAINE HYDROCHLORIDE 5 ML: 10 INJECTION, SOLUTION EPIDURAL; INFILTRATION; INTRACAUDAL; PERINEURAL at 10:21

## 2025-03-18 ENCOUNTER — TELEPHONE (OUTPATIENT)
Dept: MAMMOGRAPHY | Facility: CLINIC | Age: 50
End: 2025-03-18
Payer: COMMERCIAL

## 2025-03-18 LAB
PATH REPORT.COMMENTS IMP SPEC: NORMAL
PATH REPORT.FINAL DX SPEC: NORMAL
PATH REPORT.GROSS SPEC: NORMAL
PATH REPORT.MICROSCOPIC SPEC OTHER STN: NORMAL
PATH REPORT.RELEVANT HX SPEC: NORMAL
PHOTO IMAGE: NORMAL

## 2025-03-18 NOTE — TELEPHONE ENCOUNTER
Call placed to Alva regarding pathology results from LEFT breast biopsy performed on 3/17/2025 revealing:     [facility]  Alva Trevino [id number]  F, 1975  Surgical Pathology Report (Final result) [id number]  Authorizing Provider: Opal Bowden PA-C Ordering Provider: Opal Bowden PA-C  Ordering Location: [facility]  Imaging  Collected: 03/17/2025 10:27 AM  Pathologist: Jayshree Lloyd MD Received: 03/17/2025 10:37 AM  .  Specimens  A Breast, Left, Lefft breast mass 1:00 2cm fn =1.9cm  .  .  Final Diagnosis  A. Breast, left, 1:00, 2 cm from nipple (1.9 cm mass), ultrasound-guided needle core biopsy:  -Complex fibroadenoma including fibrocystic change with associated calcifications.  -See comment.  Electronically signed by Jayshree Lloyd MD on 3/18/2025 at 10:19 AM     Per Breast Center Radiologist, Dr. Nadir Nichols, results are concordant with imaging findings.     Recommendation: 6 month follow up left breast ultrasound     Results and Recommendations released into My Chart. Ordering provider was informed of the results and follow up plan. Left message for Alva to call [phone] with questions or concerns.      Anu Rothman RN BSN  Procedure Nurse  [facility] [place] [facility]  [phone]

## 2025-03-26 ENCOUNTER — OFFICE VISIT (OUTPATIENT)
Dept: OBGYN | Facility: CLINIC | Age: 50
End: 2025-03-26
Payer: COMMERCIAL

## 2025-03-26 VITALS
WEIGHT: 158 LBS | HEIGHT: 65 IN | HEART RATE: 82 BPM | TEMPERATURE: 98.3 F | BODY MASS INDEX: 26.33 KG/M2 | SYSTOLIC BLOOD PRESSURE: 136 MMHG | DIASTOLIC BLOOD PRESSURE: 82 MMHG | RESPIRATION RATE: 18 BRPM

## 2025-03-26 DIAGNOSIS — Z30.011 ENCOUNTER FOR INITIAL PRESCRIPTION OF CONTRACEPTIVE PILLS: ICD-10-CM

## 2025-03-26 DIAGNOSIS — N93.9 ABNORMAL UTERINE BLEEDING (AUB): Primary | ICD-10-CM

## 2025-03-26 LAB
ERYTHROCYTE [DISTWIDTH] IN BLOOD BY AUTOMATED COUNT: 13.3 % (ref 10–15)
EST. AVERAGE GLUCOSE BLD GHB EST-MCNC: 100 MG/DL
HBA1C MFR BLD: 5.1 % (ref 0–5.6)
HCG UR QL: NEGATIVE
HCT VFR BLD AUTO: 41.1 % (ref 35–47)
HGB BLD-MCNC: 13 G/DL (ref 11.7–15.7)
INTERNAL QC OK POCT: NORMAL
MCH RBC QN AUTO: 29.1 PG (ref 26.5–33)
MCHC RBC AUTO-ENTMCNC: 31.6 G/DL (ref 31.5–36.5)
MCV RBC AUTO: 92 FL (ref 78–100)
PLATELET # BLD AUTO: 341 10E3/UL (ref 150–450)
POCT KIT EXPIRATION DATE: NORMAL
POCT KIT LOT NUMBER: NORMAL
RBC # BLD AUTO: 4.46 10E6/UL (ref 3.8–5.2)
TSH SERPL DL<=0.005 MIU/L-ACNC: 2.56 UIU/ML (ref 0.3–4.2)
WBC # BLD AUTO: 8.9 10E3/UL (ref 4–11)

## 2025-03-26 PROCEDURE — 85027 COMPLETE CBC AUTOMATED: CPT | Performed by: PHYSICIAN ASSISTANT

## 2025-03-26 PROCEDURE — 84443 ASSAY THYROID STIM HORMONE: CPT | Performed by: PHYSICIAN ASSISTANT

## 2025-03-26 PROCEDURE — 83036 HEMOGLOBIN GLYCOSYLATED A1C: CPT | Performed by: PHYSICIAN ASSISTANT

## 2025-03-26 PROCEDURE — 83001 ASSAY OF GONADOTROPIN (FSH): CPT | Performed by: PHYSICIAN ASSISTANT

## 2025-03-26 PROCEDURE — 58100 BIOPSY OF UTERUS LINING: CPT | Performed by: PHYSICIAN ASSISTANT

## 2025-03-26 PROCEDURE — 36415 COLL VENOUS BLD VENIPUNCTURE: CPT | Performed by: PHYSICIAN ASSISTANT

## 2025-03-26 PROCEDURE — 99214 OFFICE O/P EST MOD 30 MIN: CPT | Mod: 25 | Performed by: PHYSICIAN ASSISTANT

## 2025-03-26 PROCEDURE — 88305 TISSUE EXAM BY PATHOLOGIST: CPT | Performed by: STUDENT IN AN ORGANIZED HEALTH CARE EDUCATION/TRAINING PROGRAM

## 2025-03-26 PROCEDURE — 3079F DIAST BP 80-89 MM HG: CPT | Performed by: PHYSICIAN ASSISTANT

## 2025-03-26 PROCEDURE — 81025 URINE PREGNANCY TEST: CPT | Performed by: PHYSICIAN ASSISTANT

## 2025-03-26 PROCEDURE — 99459 PELVIC EXAMINATION: CPT | Performed by: PHYSICIAN ASSISTANT

## 2025-03-26 PROCEDURE — 82670 ASSAY OF TOTAL ESTRADIOL: CPT | Performed by: PHYSICIAN ASSISTANT

## 2025-03-26 PROCEDURE — 84146 ASSAY OF PROLACTIN: CPT | Performed by: PHYSICIAN ASSISTANT

## 2025-03-26 PROCEDURE — 83002 ASSAY OF GONADOTROPIN (LH): CPT | Performed by: PHYSICIAN ASSISTANT

## 2025-03-26 PROCEDURE — 3075F SYST BP GE 130 - 139MM HG: CPT | Performed by: PHYSICIAN ASSISTANT

## 2025-03-26 RX ORDER — LEVONORGESTREL/ETHIN.ESTRADIOL 0.1-0.02MG
1 TABLET ORAL DAILY
Qty: 84 TABLET | Refills: 3 | Status: SHIPPED | OUTPATIENT
Start: 2025-03-26

## 2025-03-26 NOTE — PROGRESS NOTES
[facility]  OB/GYN Clinic   Gynecology Consult Note    CC:    Chief Complaint   Patient presents with    Abnormal Uterine Bleeding       HPI: Ms. Trevino is a 49 year old  being seen for GYN consultation for irregular bleeding. Today she reports regular periods, but they are lasting up to 10 days. Symptoms started over the past year. Patient on birth control pills from age [age] yrs old which was great since her periods were light and lasted 3 days. She has not been on birth control since.     GYN Hx: Reports menarche at age 14, regular menses every 28-30 days, lasting 5-7 days.  Denies significant dysmenorrhea or spotting in between periods. Denies any hx of ovarian cysts, uterine fibroids or polyps. Denies any hx of STI or PID. Currently sexually active with . Currently using condoms for contraception. Has used oral birth control. Denies any vaginal discharge, vulvar itching/burning or pain. Denies any dyspareunia or pelvic pain with defecation or urination.  Patient had breast biopsy last week which was benign. She denies any nipple drainage or visual field defects. No family of breast or ovarian cancer.   Pt denies any personal or family history of VTE. She is a non-smoker. She denies a history of significant migraines with aura, liver disease or hypertension.     Denies any hx of excessive bleeding with dental work or cuts.     ROS: A 10 pt ROS was completed and found to be otherwise negative unless mentioned in the HPI.     PMH:   No past medical history on file.    PSHx:   No past surgical history on file.    OBHx:   OB History    Para Term  AB Living   1 1 1 0 0 1   SAB IAB Ectopic Multiple Live Births   0 0 0 0 1      # Outcome Date GA Lbr Sukhwinder/2nd Weight Sex Type Anes PTL Lv   1 Term 07    F CS-Unspec   EVE       Medications:   No current outpatient medications on file.     No current facility-administered medications for this visit.       Allergies:    No  "Known Allergies    Social History:   Social History     Socioeconomic History    Marital status:      Spouse name: Not on file    Number of children: Not on file    Years of education: Not on file    Highest education level: Not on file   Occupational History    Not on file   Tobacco Use    Smoking status: Never    Smokeless tobacco: Never   Vaping Use    Vaping status: Never Used   Substance and Sexual Activity    Alcohol use: Yes     Comment: occas    Drug use: Never    Sexual activity: Yes     Partners: Male     Birth control/protection: None, Condom   Other Topics Concern    Parent/sibling w/ CABG, MI or angioplasty before 65F 55M? No   Social History Narrative    Not on file     Social Drivers of Health     Financial Resource Strain: Not on file   Food Insecurity: Not on file   Transportation Needs: Not on file   Physical Activity: Not on file   Stress: Not on file   Social Connections: Not on file   Interpersonal Safety: Not on file   Housing Stability: Not on file     Social History     Socioeconomic History    Marital status:    Tobacco Use    Smoking status: Never    Smokeless tobacco: Never   Vaping Use    Vaping status: Never Used   Substance and Sexual Activity    Alcohol use: Yes     Comment: occas    Drug use: Never    Sexual activity: Yes     Partners: Male     Birth control/protection: None, Condom   Other Topics Concern    Parent/sibling w/ CABG, MI or angioplasty before 65F 55M? No       Family History:   Family History   Problem Relation Age of Onset    Coronary Artery Disease Paternal Grandfather      Denies any family hx of bleeding disorders or reaction to anesthesia.     Physical Exam:   Vitals:    03/26/25 1500   BP: 136/82   BP Location: Right arm   Patient Position: Chair   Cuff Size: Adult Regular   Pulse: 82   Resp: 18   Temp: 98.3  F (36.8  C)   TempSrc: Tympanic   Weight: 71.7 kg (158 lb)   Height: 1.651 m (5' 5\")      Estimated body mass index is 26.29 kg/m  as calculated " "from the following:    Height as of this encounter: 1.651 m (5' 5\").    Weight as of this encounter: 71.7 kg (158 lb).    Gen: Pleasant, talkative female in no apparent distress   Endocrine: Thyroid without enlargement or nodularity   Lymph: no appreciable cervical lymphadenopathy  Respiratory: Lungs clear, breathing comfortably on room air   Cardiac: Regular rate and rhythm with no murmurs, gallops or rubs. Warm and well-perfused.   Breast: no evidence of breast mass or tenderness bilaterally, symmetric breasts, no evidence of nipple discharge or skin puckering or discoloration. Demario Stage ***.   GI: Abd soft and non-tender, +BS  : External genitalia is free of lesion. Urethra and bartholin glands normal.  Vaginal mucosa is moist and pink without unusual discharge.  Cervix is without lesions or discharge.  Pap smear and endocervical sampling obtained without incident.***  Bimanual exam reveals mobile ***verted uterus without cervical motion tenderness.  Adenexa are mobile and non-tender bilaterally. No appreciable adnexal enlargement. No tenderness over the bladder. Perineum intact. Demario Stage ***.   Rectal: no masses or hemorrhoids visually appreciated  Derm: no acanthosis nigricans, ance or facial/back/abdominal hair growth patterns   MSK: Grossly normal movement of all four extremities  Psych: mood and affect bright   Lower extremity: edema ***not present     Labs/Imaging: ***    A&P: ***  Health maintenance: ***  Return to clinic in *** weeks for ***.       Opla Bowden PA-C        " return of scant amount of tissue. This was placed in specimen jar and sent for permanent pathology. All instruments were removed.      The patient tolerated the procedure well.    Post Procedure:    PLAN : Await the results of the biopsy. There were no complications. Patient was discharged in stable condition.    The patient was given post op instructions which included activity and pelvic restrictions.  She was advised to call the clinic if excessive bleeding, pelvic pain, or fever.     Follow-up based on results.         A&P:     (N93.9) Abnormal uterine bleeding (AUB)  (primary encounter diagnosis)  Comment: work up in process. Suspect changes in her menses is due to perimenopause, but will rule out other causes today.   Plan: Estradiol, Follicle stimulating hormone,         Luteinizing Hormone, Prolactin, TSH with free         T4 reflex, CBC with platelets, Hemoglobin A1c,         US Pelvic Complete with Transvaginal, HCG         qualitative urine POCT, ENDOMETRIAL BIOPSY W/O         CERVICAL DILATION, levonorgestrel-ethinyl         estradiol (AVIANE) 0.1-20 MG-MCG tablet,         Surgical Pathology Exam, ENDOMETRIAL BIOPSY W/O        CERVICAL DILATION            (Z30.011) Encounter for initial prescription of contraceptive pills  Comment: patient has no contraindications for combination oral birth control.   Plan: levonorgestrel-ethinyl estradiol (AVIANE)         0.1-20 MG-MCG tablet            Health maintenance: pap smear not due for 5 years  Return to clinic in 2 months if no improvement of symptoms.      Opal Bowden PA-C

## 2025-03-26 NOTE — NURSING NOTE
"Initial /82 (BP Location: Right arm, Patient Position: Chair, Cuff Size: Adult Regular)   Pulse 82   Temp 98.3  F (36.8  C) (Tympanic)   Resp 18   Ht 1.651 m (5' 5\")   Wt 71.7 kg (158 lb)   LMP 03/10/2025 (Exact Date)   BMI 26.29 kg/m   Estimated body mass index is 26.29 kg/m  as calculated from the following:    Height as of this encounter: 1.651 m (5' 5\").    Weight as of this encounter: 71.7 kg (158 lb). .    "

## 2025-03-27 LAB
ESTRADIOL SERPL-MCNC: 60 PG/ML
FSH SERPL IRP2-ACNC: 9.2 MIU/ML
LH SERPL-ACNC: 7.8 MIU/ML
PROLACTIN SERPL 3RD IS-MCNC: 23 NG/ML (ref 5–23)

## 2025-03-31 LAB
PATH REPORT.COMMENTS IMP SPEC: NORMAL
PATH REPORT.COMMENTS IMP SPEC: NORMAL
PATH REPORT.FINAL DX SPEC: NORMAL
PATH REPORT.GROSS SPEC: NORMAL
PATH REPORT.MICROSCOPIC SPEC OTHER STN: NORMAL
PATH REPORT.RELEVANT HX SPEC: NORMAL
PHOTO IMAGE: NORMAL

## 2025-04-09 ENCOUNTER — HOSPITAL ENCOUNTER (OUTPATIENT)
Dept: ULTRASOUND IMAGING | Facility: CLINIC | Age: 50
Discharge: HOME | End: 2025-04-09
Attending: PHYSICIAN ASSISTANT
Payer: COMMERCIAL

## 2025-04-09 DIAGNOSIS — N93.9 ABNORMAL UTERINE BLEEDING (AUB): ICD-10-CM

## 2025-04-09 PROCEDURE — 76856 US EXAM PELVIC COMPLETE: CPT

## 2025-07-15 ENCOUNTER — OFFICE VISIT (OUTPATIENT)
Dept: FAMILY MEDICINE | Facility: CLINIC | Age: 50
End: 2025-07-15
Payer: COMMERCIAL

## 2025-07-15 VITALS
BODY MASS INDEX: 24.78 KG/M2 | HEIGHT: 66 IN | WEIGHT: 154.2 LBS | TEMPERATURE: 98.7 F | SYSTOLIC BLOOD PRESSURE: 128 MMHG | OXYGEN SATURATION: 99 % | HEART RATE: 83 BPM | DIASTOLIC BLOOD PRESSURE: 76 MMHG

## 2025-07-15 DIAGNOSIS — Z13.6 SCREENING FOR CARDIOVASCULAR CONDITION: ICD-10-CM

## 2025-07-15 DIAGNOSIS — R30.0 DYSURIA: Primary | ICD-10-CM

## 2025-07-15 DIAGNOSIS — R80.9 PROTEINURIA, UNSPECIFIED TYPE: ICD-10-CM

## 2025-07-15 LAB
ALBUMIN UR-MCNC: 30 MG/DL
APPEARANCE UR: ABNORMAL
BACTERIA #/AREA URNS HPF: ABNORMAL /HPF
BILIRUB UR QL STRIP: NEGATIVE
COLOR UR AUTO: ABNORMAL
CREAT UR-MCNC: 198.5 MG/DL
GLUCOSE UR STRIP-MCNC: NEGATIVE MG/DL
HGB UR QL STRIP: ABNORMAL
KETONES UR STRIP-MCNC: NEGATIVE MG/DL
LEUKOCYTE ESTERASE UR QL STRIP: NEGATIVE
MICROALBUMIN UR-MCNC: 33.5 MG/L
MICROALBUMIN/CREAT UR: 16.88 MG/G CR (ref 0–25)
MUCOUS THREADS #/AREA URNS LPF: PRESENT /LPF
NITRATE UR QL: NEGATIVE
PH UR STRIP: 6 [PH] (ref 5–7)
RBC #/AREA URNS AUTO: ABNORMAL /HPF
SP GR UR STRIP: 1.02 (ref 1–1.03)
SQUAMOUS #/AREA URNS AUTO: ABNORMAL /LPF
UROBILINOGEN UR STRIP-ACNC: 0.2 E.U./DL
WBC #/AREA URNS AUTO: ABNORMAL /HPF

## 2025-07-15 PROCEDURE — G2211 COMPLEX E/M VISIT ADD ON: HCPCS | Performed by: STUDENT IN AN ORGANIZED HEALTH CARE EDUCATION/TRAINING PROGRAM

## 2025-07-15 PROCEDURE — 3074F SYST BP LT 130 MM HG: CPT | Performed by: STUDENT IN AN ORGANIZED HEALTH CARE EDUCATION/TRAINING PROGRAM

## 2025-07-15 PROCEDURE — 87086 URINE CULTURE/COLONY COUNT: CPT | Performed by: STUDENT IN AN ORGANIZED HEALTH CARE EDUCATION/TRAINING PROGRAM

## 2025-07-15 PROCEDURE — 1126F AMNT PAIN NOTED NONE PRSNT: CPT | Performed by: STUDENT IN AN ORGANIZED HEALTH CARE EDUCATION/TRAINING PROGRAM

## 2025-07-15 PROCEDURE — 3078F DIAST BP <80 MM HG: CPT | Performed by: STUDENT IN AN ORGANIZED HEALTH CARE EDUCATION/TRAINING PROGRAM

## 2025-07-15 PROCEDURE — 82570 ASSAY OF URINE CREATININE: CPT | Performed by: STUDENT IN AN ORGANIZED HEALTH CARE EDUCATION/TRAINING PROGRAM

## 2025-07-15 PROCEDURE — 81001 URINALYSIS AUTO W/SCOPE: CPT | Performed by: STUDENT IN AN ORGANIZED HEALTH CARE EDUCATION/TRAINING PROGRAM

## 2025-07-15 PROCEDURE — 99213 OFFICE O/P EST LOW 20 MIN: CPT | Performed by: STUDENT IN AN ORGANIZED HEALTH CARE EDUCATION/TRAINING PROGRAM

## 2025-07-15 PROCEDURE — 82043 UR ALBUMIN QUANTITATIVE: CPT | Performed by: STUDENT IN AN ORGANIZED HEALTH CARE EDUCATION/TRAINING PROGRAM

## 2025-07-15 ASSESSMENT — PAIN SCALES - GENERAL: PAINLEVEL_OUTOF10: NO PAIN (0)

## 2025-07-15 NOTE — PROGRESS NOTES
Assessment & Plan     Dysuria  - UA macroscopic showed blood in the urine, but patient is currently on her period, there was also elevated protein, will add on urine albumin creatinine ratio   - UA Microscopic with Reflex to Culture negative for WBC, elevated RBC, but likely in the setting of menses   - at this time lower suspicion for a urinary tract infection given negative leukocyte and nitrites on the macroscopic test   - Urine Culture Aerobic Bacterial - lab collect    Proteinuria, unspecified type  - Albumin Random Urine Quantitative with Creat Ratio; Future  - Albumin Random Urine Quantitative with Creat Ratio      Follow-up plan:   - pending urine culture results, if the urine culture is negative can consider having the patient try pentosan or TCAs and see if that helps improve her symptoms and/or consider a referral to urology        Subjective   Alva is a 49 year old, presenting for the following health issues:  UTI        7/15/2025     4:02 PM   Additional Questions   Roomed by Mackenzie AVILA    History of Present Illness       Reason for visit:  UTI treatment   She is taking medications regularly.        Pre-Provider Visit Orders- Urinalysis UA/UC  Patient reports the following symptoms:  possible urinary tract infection (UTI) , possible bladder infection , discomfort, pain or burning with urination , and frequent urination   Does the patient report any of the following symptoms: vaginal discharge, vaginal itching, possible yeast infection, has a urinary catheter in place, or unable to void in a specimen cup?  No        Genitourinary - Female  Onset/Duration: June 25th 2025  Description:   Painful urination (Dysuria): YES- Towards the end            Frequency: YES reports she feels like she has to go every other hour, she reports when she urinates this frequently the urine production is less and she often will have a few dribbles; feels the need to urinate even though there isn't much urine  "production   Blood in urine (Hematuria): No  Delay in urine (Hesitency): No  Intensity: moderate, severe  Progression of Symptoms:  waxing and waning  Accompanying Signs & Symptoms:  Fever/chills: No  Flank pain: No  Nausea and vomiting: No  Vaginal symptoms: none  Abdominal/Pelvic Pain: No  History:   History of frequent UTI s: YES- last one was roughly 1 year ago   History of kidney stones: No  Sexually Active: YES  Possibility of pregnancy: No  Precipitating or alleviating factors: None  Therapies tried and outcome: Cranberry juice prn (contraindicated in Coumadin patients), Increase fluid intake, and  wasn't helpful     Symptoms come and go   At the end of urination \"there feels like there is a pain with spasms going on\"   Tries to drink plenty of water but this doesn't alleviate her symptoms   She has a history of UTIs thinks her last one may have been sometime roughly 1 year ago   Denies an hematuria   Has no history of smoking   No personal or family history of bladder cancer   Denies any fevers, chills nausea, vomiting   No foul smelling urine, has full control over bowel and bladder function (hasn't had any episodes of incontinence)   She is currently on her period (which would explain the blood in her urine)   Patient reports she has spasms after urinating   Symptoms are inconsistent, but have been present for almost 1 month some days she will have symptoms other days she won't   Of note patient had a c -section 18 years ago for breech presentation       Review of Systems   Constitutional:  Negative for chills and fever.   HENT:  Negative for ear pain.    Eyes:  Negative for pain.   Respiratory:  Negative for cough.    Cardiovascular:  Negative for chest pain.   Gastrointestinal:  Negative for abdominal pain.   Genitourinary:  Negative for dysuria.   Musculoskeletal:  Negative for neck pain.   Skin:  Negative for rash.   Neurological:  Negative for headaches.           Objective    /76 (BP Location: " "Right arm, Patient Position: Sitting, Cuff Size: Adult Regular)   Pulse 83   Temp 98.7  F (37.1  C) (Tympanic)   Ht 1.676 m (5' 6\")   Wt 69.9 kg (154 lb 3.2 oz)   LMP 06/15/2025 (Exact Date)   SpO2 99%   BMI 24.89 kg/m    Body mass index is 24.89 kg/m .  Physical Exam  Constitutional:       General: She is not in acute distress.  HENT:      Head: Normocephalic and atraumatic.      Right Ear: External ear normal.      Left Ear: External ear normal.      Mouth/Throat:      Mouth: Mucous membranes are moist.      Pharynx: Oropharynx is clear. No oropharyngeal exudate or posterior oropharyngeal erythema.   Eyes:      Extraocular Movements: Extraocular movements intact.   Cardiovascular:      Rate and Rhythm: Normal rate and regular rhythm.      Heart sounds: Normal heart sounds.   Pulmonary:      Effort: Pulmonary effort is normal. No respiratory distress.      Breath sounds: Normal breath sounds. No wheezing or rhonchi.   Abdominal:      Palpations: Abdomen is soft. There is no mass.      Tenderness: There is no abdominal tenderness.   Musculoskeletal:         General: No deformity. Normal range of motion.      Cervical back: Normal range of motion and neck supple.   Skin:     General: Skin is warm.      Findings: No rash.   Neurological:      General: No focal deficit present.      Mental Status: She is alert and oriented to person, place, and time.   Psychiatric:         Mood and Affect: Mood normal.                Signed Electronically by: TERRANCE PALACIO MD    "

## 2025-07-17 LAB — BACTERIA UR CULT: NORMAL

## 2025-07-18 ENCOUNTER — TELEPHONE (OUTPATIENT)
Dept: FAMILY MEDICINE | Facility: CLINIC | Age: 50
End: 2025-07-18
Payer: COMMERCIAL

## 2025-07-24 ENCOUNTER — OFFICE VISIT (OUTPATIENT)
Dept: FAMILY MEDICINE | Facility: CLINIC | Age: 50
End: 2025-07-24
Payer: COMMERCIAL

## 2025-07-24 VITALS
RESPIRATION RATE: 16 BRPM | OXYGEN SATURATION: 100 % | HEIGHT: 66 IN | SYSTOLIC BLOOD PRESSURE: 122 MMHG | BODY MASS INDEX: 24.91 KG/M2 | DIASTOLIC BLOOD PRESSURE: 70 MMHG | HEART RATE: 90 BPM | TEMPERATURE: 98.8 F | WEIGHT: 155 LBS

## 2025-07-24 DIAGNOSIS — R30.0 DYSURIA: Primary | ICD-10-CM

## 2025-07-24 ASSESSMENT — PAIN SCALES - GENERAL: PAINLEVEL_OUTOF10: NO PAIN (0)

## 2025-07-24 NOTE — PROGRESS NOTES
Assessment & Plan     Dysuria  > appears to have resolved   - patient admits she wasn't able to  prescription for pentosan as her insurance wasn't going to cover it   - offered to have patient trial TCA like amitriptyline, but given her resolution in symptoms cooperative decision making had, she prefers to hold off on TCAs or other treatment opstions which is reasonable   - patient aware to return to clinic if symptoms occur again for a repeat UA and discussion of potentially needing to start TCAs     The longitudinal plan of care for the diagnosis(es)/condition(s) as documented were addressed during this visit. Due to the added complexity in care, I will continue to support Alva in the subsequent management and with ongoing continuity of care.    The risks, benefits and treatment options of prescribed medications or other treatments have been discussed with the patient. The patient verbalized their understanding and should call or follow up if no improvement or if they develop further problems.      Tariq Calle is a 49 year old, presenting for the following health issues:  RECHECK        7/24/2025     1:42 PM   Additional Questions   Roomed by Adry Martínez   Accompanied by self     History of Present Illness       Reason for visit:  Follow up from previous visit    She eats 2-3 servings of fruits and vegetables daily.She consumes 0 sweetened beverage(s) daily.She exercises with enough effort to increase her heart rate 30 to 60 minutes per day.  She exercises with enough effort to increase her heart rate 3 or less days per week.   She is taking medications regularly.          Follow up on Urinary symptoms. Now have resolved for 2 weeks.     Spoke with patient cooperative decision making had, she prefers to hold off on TCAs or other treatment opstions which is reasonable     Review of Systems   Constitutional:  Negative for chills and fever.   HENT:  Negative for ear pain.    Eyes:  Negative for  "pain.   Respiratory:  Negative for cough.    Cardiovascular:  Negative for chest pain.   Gastrointestinal:  Negative for abdominal pain.   Genitourinary:  Negative for dysuria.   Musculoskeletal:  Negative for neck pain.   Skin:  Negative for rash.   Neurological:  Negative for headaches.           Objective    /70 (BP Location: Right arm, Patient Position: Sitting, Cuff Size: Adult Regular)   Pulse 90   Temp 98.8  F (37.1  C) (Tympanic)   Resp 16   Ht 1.67 m (5' 5.75\")   Wt 70.3 kg (155 lb)   LMP 06/15/2025 (Exact Date)   SpO2 100%   BMI 25.21 kg/m    Body mass index is 25.21 kg/m .  Physical Exam  Constitutional:       General: She is not in acute distress.  HENT:      Head: Normocephalic and atraumatic.      Right Ear: External ear normal.      Left Ear: External ear normal.      Mouth/Throat:      Mouth: Mucous membranes are moist.      Pharynx: Oropharynx is clear. No oropharyngeal exudate or posterior oropharyngeal erythema.   Eyes:      Extraocular Movements: Extraocular movements intact.   Cardiovascular:      Rate and Rhythm: Normal rate and regular rhythm.      Heart sounds: Normal heart sounds.   Pulmonary:      Effort: Pulmonary effort is normal. No respiratory distress.      Breath sounds: Normal breath sounds. No wheezing or rhonchi.   Abdominal:      Palpations: Abdomen is soft. There is no mass.      Tenderness: There is no abdominal tenderness.   Musculoskeletal:         General: No deformity. Normal range of motion.      Cervical back: Normal range of motion and neck supple.   Skin:     General: Skin is warm.      Findings: No rash.   Neurological:      General: No focal deficit present.      Mental Status: She is alert and oriented to person, place, and time.   Psychiatric:         Mood and Affect: Mood normal.                  Signed Electronically by: TERRANCE PALACIO MD    "